# Patient Record
Sex: MALE | Race: WHITE | Employment: FULL TIME | URBAN - METROPOLITAN AREA
[De-identification: names, ages, dates, MRNs, and addresses within clinical notes are randomized per-mention and may not be internally consistent; named-entity substitution may affect disease eponyms.]

---

## 2019-03-26 ENCOUNTER — HOSPITAL ENCOUNTER (EMERGENCY)
Age: 54
Discharge: HOME | End: 2019-03-26
Attending: EMERGENCY MEDICINE
Payer: COMMERCIAL

## 2019-03-26 ENCOUNTER — APPOINTMENT (OUTPATIENT)
Dept: GENERAL RADIOLOGY | Age: 54
End: 2019-03-26
Attending: EMERGENCY MEDICINE
Payer: COMMERCIAL

## 2019-03-26 VITALS
TEMPERATURE: 98.3 F | HEART RATE: 73 BPM | OXYGEN SATURATION: 96 % | SYSTOLIC BLOOD PRESSURE: 138 MMHG | DIASTOLIC BLOOD PRESSURE: 96 MMHG | RESPIRATION RATE: 15 BRPM

## 2019-03-26 DIAGNOSIS — R07.89 ATYPICAL CHEST PAIN: Primary | ICD-10-CM

## 2019-03-26 LAB
ALBUMIN SERPL-MCNC: 3.5 G/DL (ref 3.5–5)
ALBUMIN/GLOB SERPL: 1.1 {RATIO} (ref 1.1–2.2)
ALP SERPL-CCNC: 63 U/L (ref 45–117)
ALT SERPL-CCNC: 77 U/L (ref 12–78)
ANION GAP SERPL CALC-SCNC: 6 MMOL/L (ref 5–15)
AST SERPL-CCNC: 37 U/L (ref 15–37)
ATRIAL RATE: 73 BPM
BASOPHILS # BLD: 0 K/UL (ref 0–0.1)
BASOPHILS NFR BLD: 1 % (ref 0–1)
BILIRUB SERPL-MCNC: 0.3 MG/DL (ref 0.2–1)
BUN SERPL-MCNC: 9 MG/DL (ref 6–20)
BUN/CREAT SERPL: 10 (ref 12–20)
CALCIUM SERPL-MCNC: 9 MG/DL (ref 8.5–10.1)
CALCULATED R AXIS, ECG10: 25 DEGREES
CALCULATED T AXIS, ECG11: 32 DEGREES
CHLORIDE SERPL-SCNC: 110 MMOL/L (ref 97–108)
CK SERPL-CCNC: 174 U/L (ref 39–308)
CO2 SERPL-SCNC: 25 MMOL/L (ref 21–32)
COMMENT, HOLDF: NORMAL
CREAT SERPL-MCNC: 0.92 MG/DL (ref 0.7–1.3)
DIAGNOSIS, 93000: NORMAL
DIFFERENTIAL METHOD BLD: NORMAL
EOSINOPHIL # BLD: 0.2 K/UL (ref 0–0.4)
EOSINOPHIL NFR BLD: 4 % (ref 0–7)
ERYTHROCYTE [DISTWIDTH] IN BLOOD BY AUTOMATED COUNT: 12 % (ref 11.5–14.5)
GLOBULIN SER CALC-MCNC: 3.2 G/DL (ref 2–4)
GLUCOSE SERPL-MCNC: 109 MG/DL (ref 65–100)
HCT VFR BLD AUTO: 41.4 % (ref 36.6–50.3)
HGB BLD-MCNC: 14.3 G/DL (ref 12.1–17)
IMM GRANULOCYTES # BLD AUTO: 0 K/UL (ref 0–0.04)
IMM GRANULOCYTES NFR BLD AUTO: 0 % (ref 0–0.5)
LIPASE SERPL-CCNC: 148 U/L (ref 73–393)
LYMPHOCYTES # BLD: 1.2 K/UL (ref 0.8–3.5)
LYMPHOCYTES NFR BLD: 28 % (ref 12–49)
MCH RBC QN AUTO: 32.1 PG (ref 26–34)
MCHC RBC AUTO-ENTMCNC: 34.5 G/DL (ref 30–36.5)
MCV RBC AUTO: 92.8 FL (ref 80–99)
MONOCYTES # BLD: 0.4 K/UL (ref 0–1)
MONOCYTES NFR BLD: 9 % (ref 5–13)
NEUTS SEG # BLD: 2.4 K/UL (ref 1.8–8)
NEUTS SEG NFR BLD: 58 % (ref 32–75)
NRBC # BLD: 0 K/UL (ref 0–0.01)
NRBC BLD-RTO: 0 PER 100 WBC
P-R INTERVAL, ECG05: 172 MS
PLATELET # BLD AUTO: 200 K/UL (ref 150–400)
PMV BLD AUTO: 9.2 FL (ref 8.9–12.9)
POTASSIUM SERPL-SCNC: 3.9 MMOL/L (ref 3.5–5.1)
PROT SERPL-MCNC: 6.7 G/DL (ref 6.4–8.2)
Q-T INTERVAL, ECG07: 384 MS
QRS DURATION, ECG06: 88 MS
QTC CALCULATION (BEZET), ECG08: 423 MS
RBC # BLD AUTO: 4.46 M/UL (ref 4.1–5.7)
SAMPLES BEING HELD,HOLD: NORMAL
SODIUM SERPL-SCNC: 141 MMOL/L (ref 136–145)
TROPONIN I SERPL-MCNC: <0.05 NG/ML
TROPONIN I SERPL-MCNC: <0.05 NG/ML
VENTRICULAR RATE, ECG03: 73 BPM
WBC # BLD AUTO: 4.2 K/UL (ref 4.1–11.1)

## 2019-03-26 PROCEDURE — 85025 COMPLETE CBC W/AUTO DIFF WBC: CPT

## 2019-03-26 PROCEDURE — 84484 ASSAY OF TROPONIN QUANT: CPT

## 2019-03-26 PROCEDURE — 82550 ASSAY OF CK (CPK): CPT

## 2019-03-26 PROCEDURE — 83690 ASSAY OF LIPASE: CPT

## 2019-03-26 PROCEDURE — 80053 COMPREHEN METABOLIC PANEL: CPT

## 2019-03-26 PROCEDURE — 71046 X-RAY EXAM CHEST 2 VIEWS: CPT

## 2019-03-26 PROCEDURE — 93005 ELECTROCARDIOGRAM TRACING: CPT

## 2019-03-26 PROCEDURE — 99284 EMERGENCY DEPT VISIT MOD MDM: CPT

## 2019-03-26 PROCEDURE — 36415 COLL VENOUS BLD VENIPUNCTURE: CPT

## 2019-03-26 NOTE — ED NOTES
Patient updated on lab results and plan to redraw troponin at 10am. Patient verbalized understanding and ambulatory to restroom with steady gait. No further needs at this time.

## 2019-03-26 NOTE — DISCHARGE INSTRUCTIONS
Patient Education        Chest Pain: Care Instructions  Your Care Instructions    There are many things that can cause chest pain. Some are not serious and will get better on their own in a few days. But some kinds of chest pain need more testing and treatment. Your doctor may have recommended a follow-up visit in the next 8 to 12 hours. If you are not getting better, you may need more tests or treatment. Even though your doctor has released you, you still need to watch for any problems. The doctor carefully checked you, but sometimes problems can develop later. If you have new symptoms or if your symptoms do not get better, get medical care right away. If you have worse or different chest pain or pressure that lasts more than 5 minutes or you passed out (lost consciousness), call 911 or seek other emergency help right away. A medical visit is only one step in your treatment. Even if you feel better, you still need to do what your doctor recommends, such as going to all suggested follow-up appointments and taking medicines exactly as directed. This will help you recover and help prevent future problems. How can you care for yourself at home? · Rest until you feel better. · Take your medicine exactly as prescribed. Call your doctor if you think you are having a problem with your medicine. · Do not drive after taking a prescription pain medicine. When should you call for help? Call 911 if:    · You passed out (lost consciousness).     · You have severe difficulty breathing.     · You have symptoms of a heart attack. These may include:  ? Chest pain or pressure, or a strange feeling in your chest.  ? Sweating. ? Shortness of breath. ? Nausea or vomiting. ? Pain, pressure, or a strange feeling in your back, neck, jaw, or upper belly or in one or both shoulders or arms. ? Lightheadedness or sudden weakness. ? A fast or irregular heartbeat.   After you call 911, the  may tell you to chew 1 adult-strength or 2 to 4 low-dose aspirin. Wait for an ambulance. Do not try to drive yourself.    Call your doctor today if:    · You have any trouble breathing.     · Your chest pain gets worse.     · You are dizzy or lightheaded, or you feel like you may faint.     · You are not getting better as expected.     · You are having new or different chest pain. Where can you learn more? Go to http://shana-jose.info/. Enter A120 in the search box to learn more about \"Chest Pain: Care Instructions. \"  Current as of: September 23, 2018  Content Version: 11.9  © 8763-2484 [employer]. Care instructions adapted under license by [employer] (which disclaims liability or warranty for this information). If you have questions about a medical condition or this instruction, always ask your healthcare professional. [place] [address] any warranty or liability for your use of this information. We hope that we have addressed all of your medical concerns. The examination and treatment you received in the Emergency Department were for an emergent problem and were not intended as complete care. It is important that you follow up with your healthcare provider(s) for ongoing care. If your symptoms worsen or do not improve as expected, and you are unable to reach your usual health care provider(s), you should return to the Emergency Department. Today's healthcare is undergoing tremendous change, and patient satisfaction surveys are one of the many tools to assess the quality of medical care. You may receive a survey from the [employer] organization regarding your experience in the Emergency Department. I hope that your experience has been completely positive, particularly the medical care that I provided. As such, please participate in the survey; anything less than excellent does not meet my expectations or intentions.         Formerly [facility] and [facility] participate in nationally recognized quality of care measures. If your blood pressure is greater than 120/80, as reported below, we urge that you seek medical care to address the potential of high blood pressure, commonly known as hypertension. Hypertension can be hereditary or can be caused by certain medical conditions, pain, stress, or \"white coat syndrome. \"       Please make an appointment with your health care provider(s) for follow up of your Emergency Department visit. VITALS:   Patient Vitals for the past 8 hrs:   Temp Pulse Resp BP SpO2   03/26/19 0805 98.3 °F (36.8 °C) 82 16 (!) 140/93 97 %   03/26/19 0749 -- 73 -- -- 97 %          Thank you for allowing us to provide you with medical care today. We realize that you have many choices for your emergency care needs. Please choose us in the future for any continued health care needs. [place] [address].   Office: [phone]            Recent Results (from the past 24 hour(s))   EKG, 12 LEAD, INITIAL    Collection Time: 03/26/19  7:47 AM   Result Value Ref Range    Ventricular Rate 73 BPM    Atrial Rate 73 BPM    P-R Interval 172 ms    QRS Duration 88 ms    Q-T Interval 384 ms    QTC Calculation (Bezet) 423 ms    Calculated R Axis 25 degrees    Calculated T Axis 32 degrees    Diagnosis       Normal sinus rhythm  No previous ECGs available  Confirmed by Yenny Salguero M.D., Dulce Richardson ([id number]) on 3/26/2019 10:12:13 AM     SAMPLES BEING HELD    Collection Time: 03/26/19  7:58 AM   Result Value Ref Range    SAMPLES BEING HELD 1RED,1BLUE     COMMENT        Add-on orders for these samples will be processed based on acceptable specimen integrity and analyte stability, which may vary by analyte.    CBC WITH AUTOMATED DIFF    Collection Time: 03/26/19  7:58 AM   Result Value Ref Range    WBC 4.2 4.1 - 11.1 K/uL    RBC 4.46 4.10 - 5.70 M/uL    HGB 14.3 12.1 - 17.0 g/dL    HCT 41.4 36.6 - 50.3 %    MCV 92.8 80.0 - 99.0 FL    MCH 32.1 26.0 - 34.0 PG    MCHC 34.5 30.0 - 36.5 g/dL    RDW 12.0 11.5 - 14.5 %    PLATELET 271 913 - 101 K/uL    MPV 9.2 8.9 - 12.9 FL    NRBC 0.0 0  WBC    ABSOLUTE NRBC 0.00 0.00 - 0.01 K/uL    NEUTROPHILS 58 32 - 75 %    LYMPHOCYTES 28 12 - 49 %    MONOCYTES 9 5 - 13 %    EOSINOPHILS 4 0 - 7 %    BASOPHILS 1 0 - 1 %    IMMATURE GRANULOCYTES 0 0.0 - 0.5 %    ABS. NEUTROPHILS 2.4 1.8 - 8.0 K/UL    ABS. LYMPHOCYTES 1.2 0.8 - 3.5 K/UL    ABS. MONOCYTES 0.4 0.0 - 1.0 K/UL    ABS. EOSINOPHILS 0.2 0.0 - 0.4 K/UL    ABS. BASOPHILS 0.0 0.0 - 0.1 K/UL    ABS. IMM. GRANS. 0.0 0.00 - 0.04 K/UL    DF AUTOMATED     METABOLIC PANEL, COMPREHENSIVE    Collection Time: 03/26/19  7:58 AM   Result Value Ref Range    Sodium 141 136 - 145 mmol/L    Potassium 3.9 3.5 - 5.1 mmol/L    Chloride 110 (H) 97 - 108 mmol/L    CO2 25 21 - 32 mmol/L    Anion gap 6 5 - 15 mmol/L    Glucose 109 (H) 65 - 100 mg/dL    BUN 9 6 - 20 MG/DL    Creatinine 0.92 0.70 - 1.30 MG/DL    BUN/Creatinine ratio 10 (L) 12 - 20      GFR est AA >60 >60 ml/min/1.73m2    GFR est non-AA >60 >60 ml/min/1.73m2    Calcium 9.0 8.5 - 10.1 MG/DL    Bilirubin, total 0.3 0.2 - 1.0 MG/DL    ALT (SGPT) 77 12 - 78 U/L    AST (SGOT) 37 15 - 37 U/L    Alk.  phosphatase 63 45 - 117 U/L    Protein, total 6.7 6.4 - 8.2 g/dL    Albumin 3.5 3.5 - 5.0 g/dL    Globulin 3.2 2.0 - 4.0 g/dL    A-G Ratio 1.1 1.1 - 2.2     TROPONIN I    Collection Time: 03/26/19  7:58 AM   Result Value Ref Range    Troponin-I, Qt. <0.05 <0.05 ng/mL   CK W/ REFLX CKMB    Collection Time: 03/26/19  7:58 AM   Result Value Ref Range     39 - 308 U/L   LIPASE    Collection Time: 03/26/19  7:58 AM   Result Value Ref Range    Lipase 148 73 - 393 U/L   TROPONIN I    Collection Time: 03/26/19  9:47 AM   Result Value Ref Range    Troponin-I, Qt. <0.05 <0.05 ng/mL       Xr Chest Pa Lat    Result Date: 3/26/2019  Exam:  2 view chest Indication: Midsternal chest pain PA and lateral views demonstrate normal heart size. There is no acute process in the lung fields. The osseous structures are unremarkable. Impression: No acute process.

## 2019-03-26 NOTE — ED PROVIDER NOTES
The pt is a 48year old male with history of HSV presents ambulatory from home with complaints intermittent chest pain since Friday. Episodes last several hours and then resolve. Non exertional. No SOB. Nothing makes it better. Nothing makes it worse. + Stress at work. Pt denies fevers, chills, night sweats, SOB, MAYNARD, PND, orthopnea, abdominal pain, n/v/d, melena, hematuria, dysuria, constipation, HA, dizziness, and syncope. Past Medical History: 
No date: Infectious disease Comment:  herpes History reviewed. No pertinent surgical history. PCP: 
Jose Marcelino MD 
 
 
 
 
  
 
Past Medical History:  
Diagnosis Date  Infectious disease   
 herpes History reviewed. No pertinent surgical history. History reviewed. No pertinent family history. Social History Socioeconomic History  Marital status:  Spouse name: Not on file  Number of children: Not on file  Years of education: Not on file  Highest education level: Not on file Occupational History  Not on file Social Needs  Financial resource strain: Not on file  Food insecurity:  
  Worry: Not on file Inability: Not on file  Transportation needs:  
  Medical: Not on file Non-medical: Not on file Tobacco Use  Smoking status: Never Smoker  Smokeless tobacco: Never Used Substance and Sexual Activity  Alcohol use: Yes  Drug use: Never  Sexual activity: Not on file Lifestyle  Physical activity:  
  Days per week: Not on file Minutes per session: Not on file  Stress: Not on file Relationships  Social connections:  
  Talks on phone: Not on file Gets together: Not on file Attends Jewish service: Not on file Active member of club or organization: Not on file Attends meetings of clubs or organizations: Not on file Relationship status: Not on file  Intimate partner violence:  
  Fear of current or ex partner: Not on file Emotionally abused: Not on file Physically abused: Not on file Forced sexual activity: Not on file Other Topics Concern  Not on file Social History Narrative  Not on file ALLERGIES: Patient has no known allergies. Review of Systems Constitutional: Negative for activity change, appetite change, chills, diaphoresis, fatigue, fever and unexpected weight change. HENT: Negative for congestion, ear pain, rhinorrhea, sinus pressure, sore throat, tinnitus, trouble swallowing and voice change. Eyes: Negative for pain, discharge, redness and visual disturbance. Respiratory: Negative for apnea, cough, choking, chest tightness, shortness of breath, wheezing and stridor. Cardiovascular: Positive for chest pain. Negative for palpitations and leg swelling. Gastrointestinal: Negative for abdominal pain, constipation, nausea and vomiting. Endocrine: Negative for cold intolerance and heat intolerance. Genitourinary: Negative for difficulty urinating, dysuria, flank pain, hematuria, testicular pain and urgency. Musculoskeletal: Negative for arthralgias, back pain, gait problem, joint swelling, myalgias, neck pain and neck stiffness. Skin: Negative for color change, pallor, rash and wound. Allergic/Immunologic: Negative for immunocompromised state. Neurological: Negative for dizziness, tremors, syncope, weakness, light-headedness, numbness and headaches. Hematological: Does not bruise/bleed easily. Psychiatric/Behavioral: Negative for agitation, confusion and suicidal ideas. Vitals:  
 03/26/19 2321 03/26/19 0805 BP:  (!) 140/93 Pulse: 73 82 Resp:  16 Temp:  98.3 °F (36.8 °C) SpO2: 97% 97% Physical Exam  
Constitutional: He is oriented to person, place, and time. He appears well-developed and well-nourished. No distress. HENT:  
Head: Atraumatic. Nose: Nose normal.  
Mouth/Throat: No oropharyngeal exudate. Eyes: Conjunctivae and EOM are normal. Right eye exhibits no discharge. Left eye exhibits no discharge. No scleral icterus. Neck: Normal range of motion. Neck supple. No JVD present. No tracheal deviation present. No thyromegaly present. Cardiovascular: Normal rate and regular rhythm. Exam reveals no gallop and no friction rub. No murmur heard. Pulmonary/Chest: Breath sounds normal. No stridor. No respiratory distress. He has no wheezes. He has no rales. He exhibits no tenderness. Abdominal: Soft. Bowel sounds are normal. He exhibits no distension and no mass. There is no tenderness. There is no rebound and no guarding. Musculoskeletal: Normal range of motion. He exhibits no edema or tenderness. Lymphadenopathy:  
  He has no cervical adenopathy. Neurological: He is alert and oriented to person, place, and time. Coordination normal.  
Skin: Skin is warm and dry. He is not diaphoretic. Psychiatric: He has a normal mood and affect. His behavior is normal.  
  
 
MDM Number of Diagnoses or Management Options Diagnosis management comments:  
 * routine laboratory data * EKG 
 * CXR Amount and/or Complexity of Data Reviewed Clinical lab tests: ordered and reviewed Tests in the radiology section of CPT®: ordered and reviewed Tests in the medicine section of CPT®: ordered and reviewed Discussion of test results with the performing providers: yes Review and summarize past medical records: yes Discuss the patient with other providers: yes Critical Care Total time providing critical care: < 30 minutes Patient Progress Patient progress: improved Procedures 11:06 AM 
Pt has been reevaluated. There are no new complaints, changes, or physical findings at this time. Medications have been reviewed w/ pt and/or family. Pt and/or family's questions have been answered.  Pt and/or family expressed good understanding of the dx/tx/rx and is in agreement with plan of care. Pt instructed and agreed to f/u w/ PCP and Cards and to return to ED upon further deterioration. Pt is ready for discharge. LABORATORY TESTS: 
Recent Results (from the past 12 hour(s)) EKG, 12 LEAD, INITIAL Collection Time: 03/26/19  7:47 AM  
Result Value Ref Range Ventricular Rate 73 BPM  
 Atrial Rate 73 BPM  
 P-R Interval 172 ms QRS Duration 88 ms Q-T Interval 384 ms QTC Calculation (Bezet) 423 ms Calculated R Axis 25 degrees Calculated T Axis 32 degrees Diagnosis Normal sinus rhythm No previous ECGs available Confirmed by Fina Neal M.D., Derrill Capers ([id number]) on 3/26/2019 10:12:13 AM 
  
SAMPLES BEING HELD Collection Time: 03/26/19  7:58 AM  
Result Value Ref Range SAMPLES BEING HELD 1RED,1BLUE   
 COMMENT Add-on orders for these samples will be processed based on acceptable specimen integrity and analyte stability, which may vary by analyte. CBC WITH AUTOMATED DIFF Collection Time: 03/26/19  7:58 AM  
Result Value Ref Range WBC 4.2 4.1 - 11.1 K/uL  
 RBC 4.46 4.10 - 5.70 M/uL  
 HGB 14.3 12.1 - 17.0 g/dL HCT 41.4 36.6 - 50.3 % MCV 92.8 80.0 - 99.0 FL  
 MCH 32.1 26.0 - 34.0 PG  
 MCHC 34.5 30.0 - 36.5 g/dL  
 RDW 12.0 11.5 - 14.5 % PLATELET 483 838 - 159 K/uL MPV 9.2 8.9 - 12.9 FL  
 NRBC 0.0 0  WBC ABSOLUTE NRBC 0.00 0.00 - 0.01 K/uL NEUTROPHILS 58 32 - 75 % LYMPHOCYTES 28 12 - 49 % MONOCYTES 9 5 - 13 % EOSINOPHILS 4 0 - 7 % BASOPHILS 1 0 - 1 % IMMATURE GRANULOCYTES 0 0.0 - 0.5 % ABS. NEUTROPHILS 2.4 1.8 - 8.0 K/UL  
 ABS. LYMPHOCYTES 1.2 0.8 - 3.5 K/UL  
 ABS. MONOCYTES 0.4 0.0 - 1.0 K/UL  
 ABS. EOSINOPHILS 0.2 0.0 - 0.4 K/UL  
 ABS. BASOPHILS 0.0 0.0 - 0.1 K/UL  
 ABS. IMM. GRANS. 0.0 0.00 - 0.04 K/UL  
 DF AUTOMATED METABOLIC PANEL, COMPREHENSIVE Collection Time: 03/26/19  7:58 AM  
Result Value Ref Range Sodium 141 136 - 145 mmol/L  Potassium 3.9 3.5 - 5.1 mmol/L  
 Chloride 110 (H) 97 - 108 mmol/L  
 CO2 25 21 - 32 mmol/L Anion gap 6 5 - 15 mmol/L Glucose 109 (H) 65 - 100 mg/dL BUN 9 6 - 20 MG/DL Creatinine 0.92 0.70 - 1.30 MG/DL  
 BUN/Creatinine ratio 10 (L) 12 - 20 GFR est AA >60 >60 ml/min/1.73m2 GFR est non-AA >60 >60 ml/min/1.73m2 Calcium 9.0 8.5 - 10.1 MG/DL Bilirubin, total 0.3 0.2 - 1.0 MG/DL  
 ALT (SGPT) 77 12 - 78 U/L  
 AST (SGOT) 37 15 - 37 U/L Alk. phosphatase 63 45 - 117 U/L Protein, total 6.7 6.4 - 8.2 g/dL Albumin 3.5 3.5 - 5.0 g/dL Globulin 3.2 2.0 - 4.0 g/dL A-G Ratio 1.1 1.1 - 2.2    
TROPONIN I Collection Time: 03/26/19  7:58 AM  
Result Value Ref Range Troponin-I, Qt. <0.05 <0.05 ng/mL CK W/ REFLX CKMB Collection Time: 03/26/19  7:58 AM  
Result Value Ref Range  39 - 308 U/L  
LIPASE Collection Time: 03/26/19  7:58 AM  
Result Value Ref Range Lipase 148 73 - 393 U/L  
TROPONIN I Collection Time: 03/26/19  9:47 AM  
Result Value Ref Range Troponin-I, Qt. <0.05 <0.05 ng/mL IMAGING RESULTS: 
XR CHEST PA LAT Final Result Impression: No acute process. Xr Chest Pa Lat Result Date: 3/26/2019 Exam:  2 view chest Indication: Midsternal chest pain PA and lateral views demonstrate normal heart size. There is no acute process in the lung fields. The osseous structures are unremarkable. Impression: No acute process. MEDICATIONS GIVEN: 
Medications - No data to display IMPRESSION: 
1. Atypical chest pain PLAN: 
1. There are no discharge medications for this patient. 2.  
Follow-up Information Follow up With Specialties Details Why Contact Info Paras Perez MD [facility] In 2 days  [address] [facility] [address] [place] [id number] 
[phone] Jun Lugo MD Cardiology In 2 days  [address] [place] [address] 
[phone]  [facility] EMERGENCY DEP Emergency Medicine  As needed, If symptoms worsen [address] [place] [address] [id number] [place] [address] 
[phone]  
  
 
3. Supportive care Return to ED if worse Rajiv Morales NP 
11:06 AM

## 2019-03-26 NOTE — ED TRIAGE NOTES
Pt states that he developed mid sternal chest pain while driving on Friday stating that it reoccurred Saturday and again this morning with no N/V or SOB.

## 2019-03-26 NOTE — ED NOTES
1104: Patient verbalizes understanding of discharge instructions given by provider, Marika Veliz NP. Opportunity for questions provided. Patient in no apparent distress. Patient ambulatory upon discharge. Visit Vitals BP (!) 138/96 (BP 1 Location: Left arm, BP Patient Position: At rest;Sitting) Pulse 73 Temp 98.3 °F (36.8 °C) Resp 15 SpO2 96%

## 2019-03-27 ENCOUNTER — OFFICE VISIT (OUTPATIENT)
Dept: CARDIOLOGY CLINIC | Age: 54
End: 2019-03-27

## 2019-03-27 VITALS
WEIGHT: 179.2 LBS | RESPIRATION RATE: 16 BRPM | HEIGHT: 69 IN | HEART RATE: 77 BPM | BODY MASS INDEX: 26.54 KG/M2 | OXYGEN SATURATION: 97 % | SYSTOLIC BLOOD PRESSURE: 120 MMHG | DIASTOLIC BLOOD PRESSURE: 74 MMHG

## 2019-03-27 DIAGNOSIS — Z82.49 FAMILY HISTORY OF EARLY CAD: ICD-10-CM

## 2019-03-27 DIAGNOSIS — R07.9 CHEST PAIN, UNSPECIFIED TYPE: Primary | ICD-10-CM

## 2019-03-27 RX ORDER — ACYCLOVIR 400 MG/1
TABLET ORAL
COMMUNITY

## 2019-03-27 NOTE — PATIENT INSTRUCTIONS
If your stress test is ok, please consider having a coronary calcium scan (heart scan) done to look for evidence of early plaque in the arteries of your heart. You should call [phone] to schedule this. This test is not covered by insurance and will cost you approximately $129 this month. Please let the imaging center know that you are a patient of Dr. Alyse Galloway so they can send her the results.

## 2019-03-27 NOTE — PROGRESS NOTES
[facility]  [id number]    Reason for consult: chest pain  Requesting physician: ER referral    HPI: Graham Mayo, a 48y.o. year-old who presents for evaluation of chest pain. On Friday he started feeling chest pain driving to work, admits to having a lot of stress at work recently  He described it as left sided chest tightness, no associated symptoms, no radiation   It lasted most of the day Friday, sort of waxing and waning but never going away  Around 12pm on Saturday it resolved  Tuesday morning it started up again and he went to ER  He has never had chest pain before  Pain is not changed by deep inspiration, leaning forward  He denies any palpitations, dizziness or syncope, no LE edema  Used to run up to 6 miles/day but hurt his knee several months ago and has not been exercising as much  Drinks one soda/day, sleeps 7-8 hours/night  Told he had a heart murmur as a child, last echocardiogram in his 35s   Reports borderline high cholesterol     Reviewed stress as a CAD RF, and mgmt, prevention of CAD, lifestyle modification, etc. Continues to have some mild tightness in the chest today. Assessment/Plan:  1. Chest pain - negative troponin x 2 in ER, ECG did not have any ischemic changes, will proceed with stress echo to assess for ischemia  2. Early family history of CVA - advised him to have a coronary calcium scan to look for evidence of early plaque in the arteries of his heart if his stress test is negative for ischemia, then discuss further medical mgmt, statin, asa, etc  3. Hx of heart murmur - 1/6 BHARGAV LLSB, will check valves during stress echo    Fam hx: father with HTN and passed at age 80 from old age, brother had a CVA mid 46s, mother had CVA in her 76s and has COPD   Soc hx: no tobacco use, drinks 1-2 beers/day, no drug use     He  has a past medical history of Infectious disease.     Cardiovascular ROS: positive for chest pain  Respiratory ROS: no cough, shortness of breath, or wheezing  Neurological ROS: no TIA or stroke symptoms  All other systems negative except as above. PE  Vitals:    03/27/19 1051   BP: 120/74   Pulse: 77   Resp: 16   SpO2: 97%   Weight: 179 lb 3.2 oz (81.3 kg)   Height: 5' 9\" (1.753 m)    Body mass index is 26.46 kg/m². General appearance - alert, well appearing, and in no distress  Mental status - affect appropriate to mood  Eyes - sclera anicteric, moist mucous membranes  Neck - supple  Lymphatics - not assessed  Chest - clear to auscultation, no wheezes, rales or rhonchi  Heart - normal rate, regular rhythm, normal S1, S2, 1/6 BHARGAV LLSB  Abdomen - soft, nontender, nondistended  Back exam - full range of motion, no tenderness  Neurological - cranial nerves II through XII grossly intact, no focal deficit  Musculoskeletal - no muscular tenderness noted, normal strength  Extremities - peripheral pulses normal, no pedal edema  Skin - normal coloration  no rashes    12 lead ECG: NSR    Recent Labs:  No results found for: CHOL, CHOLX, CHLST, CHOLV, 639087, HDL, LDL, LDLC, DLDLP, TGLX, TRIGL, TRIGP, CHHD, CHHDX  Lab Results   Component Value Date/Time    Creatinine 0.92 03/26/2019 07:58 AM     Lab Results   Component Value Date/Time    BUN 9 03/26/2019 07:58 AM     Lab Results   Component Value Date/Time    Potassium 3.9 03/26/2019 07:58 AM     No results found for: HBA1C, HGBE8, EAV5WHUR, EHM8PLAM  Lab Results   Component Value Date/Time    HGB 14.3 03/26/2019 07:58 AM     Lab Results   Component Value Date/Time    PLATELET 841 84/37/2815 07:58 AM       Reviewed:  Past Medical History:   Diagnosis Date    Infectious disease     herpes     Social History     Tobacco Use   Smoking Status Never Smoker   Smokeless Tobacco Never Used     Social History     Substance and Sexual Activity   Alcohol Use Yes     No Known Allergies    Current Outpatient Medications   Medication Sig    acyclovir (ZOVIRAX) 400 mg tablet Take  by mouth.  Unknown dose PO BID    Cetirizine (ZYRTEC) 10 mg cap Take 10 mg by mouth daily as needed. No current facility-administered medications for this visit.         Tayo Perez MD  [facility] [address] [place] [address] [address] [place] [address]  Aranza Souza  [phone]

## 2019-04-15 ENCOUNTER — TELEPHONE (OUTPATIENT)
Dept: CARDIOLOGY CLINIC | Age: 54
End: 2019-04-15

## 2019-04-15 NOTE — TELEPHONE ENCOUNTER
Per Car Lewis NP:      No, just follow up with PCP     Informed patient of the above. Verbalized understanding.

## 2019-04-15 NOTE — TELEPHONE ENCOUNTER
Attempted to reach patient by telephone.  A message was left for return call.     ----- Message from Dorina Duran NP sent at 4/14/2019  7:03 PM EDT -----  Stress echo WNL, no further testing needed at this time

## 2024-04-22 ENCOUNTER — CLINICAL DOCUMENTATION (OUTPATIENT)
Age: 59
End: 2024-04-22

## 2024-04-22 NOTE — PROGRESS NOTES
Records received from PCP and per updated office protocol do not meet criteria for further review. Schedule next available for fatty liver

## 2024-05-06 ENCOUNTER — CLINICAL DOCUMENTATION (OUTPATIENT)
Age: 59
End: 2024-05-06

## 2024-05-06 NOTE — PROGRESS NOTES
Left message on 5/3, 5/6 to call to schedule NP appt-fatty liver, next available. Records received.